# Patient Record
Sex: MALE
[De-identification: names, ages, dates, MRNs, and addresses within clinical notes are randomized per-mention and may not be internally consistent; named-entity substitution may affect disease eponyms.]

---

## 2019-06-27 ENCOUNTER — APPOINTMENT (OUTPATIENT)
Dept: ORTHOPEDIC SURGERY | Facility: CLINIC | Age: 39
End: 2019-06-27
Payer: COMMERCIAL

## 2019-06-27 VITALS — WEIGHT: 170 LBS | HEIGHT: 69 IN | BODY MASS INDEX: 25.18 KG/M2

## 2019-06-27 DIAGNOSIS — Z78.9 OTHER SPECIFIED HEALTH STATUS: ICD-10-CM

## 2019-06-27 DIAGNOSIS — Z87.39 PERSONAL HISTORY OF OTHER DISEASES OF THE MUSCULOSKELETAL SYSTEM AND CONNECTIVE TISSUE: ICD-10-CM

## 2019-06-27 DIAGNOSIS — S86.011A STRAIN OF RIGHT ACHILLES TENDON, INITIAL ENCOUNTER: ICD-10-CM

## 2019-06-27 PROCEDURE — 99204 OFFICE O/P NEW MOD 45 MIN: CPT

## 2019-06-27 NOTE — PHYSICAL EXAM
[de-identified] : Constitutional\par o Appearance : well-nourished, well developed, alert, in no acute distress \par Head and Face\par o Head :\par ¦ Inspection : atraumatic, normocephalic\par o Face :\par ¦ Inspection : no visible rash or discoloration\par Lymphatic\par o Epitrochlear Nodes : no lymphadenopathy \par o Popliteal Nodes : no palpable nodes present \par o Supraclavicular Nodes : no supraclavicular nodes present \par o Preauricular Nodes : no preauricular nodes present \par o Additional Nodes : no additional adenopathy present Skin and Subcutaneous Tissue \par o Head and Neck :\par ¦ Face : no facial lesions\par Neurologic\par o Mental Status Examination :\par ¦ Orientation : alert and oriented X 3\par o Coordination : finger-to-nose-to-finger testing normal bilaterally, heel-shin cerebellar test within normal limits bilaterally \par Psychiatric\par o Mood and Affect: mood normal, affect appropriate \par \par Right Lower Extremity\par o Right ankle :\par ¦ Inspection/Palpation : generalized swelling and tenderness to palpation over the distal achilles , gap of his Achilles tendon \par ¦ Range of Motion : pain with ROM especially dorsiflexion, unable to plantarflex. \par ¦ Stability : no joint instability on provocative testing\par ¦ Strength : limited by pain\par o Skin : no erythema or ecchymosis present, skin is intact\par o Sensation : sensation to pin intact\par Neurovascularly intact. \par Tests: Positive [place] test\par o Right foot:\par ¦ Inspection/Palpation : generalized tenderness to palpation, generalized swelling\par ¦ Range of Motion : arc of motion limited by pain, cannot plantar flex\par ¦ Stability : no joint instability on provocative testing\par ¦ Strength : limited by pain\par o Skin : no erythema or ecchymosis present\par \par Left Lower Extremity \par o Left ankle :\par ¦ Inspection/Palpation : no tenderness to palpation, no swelling \par ¦ Range of Motion : arc of motion full and painless in all planes\par ¦ Stability : no joint instability en provocative testing\par ¦ Strength : all muscles 5/5\par ¦ Tests and Signs : [place] test negative\par o Skin : no erythema or ecchymosis present\par o Sensation : sensation to pin intact\par \par o Left foot:\par ¦ Inspection/Palpation : no tenderness to palpation, no swelling\par ¦ Range of Motion : arc of motion full and painless in all planes\par ¦ Stability : no joint instability on provocative testing\par ¦ Strength : all muscles 5/5\par o Skin : no erythema or ecchymosis present\par \par Gait and Station:\par Gait: Ambulating with crutches NWB on the right , no significant extremity swelling or lymphedema\par

## 2019-06-27 NOTE — ADDENDUM
[FreeTextEntry1] : I, Asad Murray, acted solely as a scribe for Dr. Myron Devries on this date 06/27/2019 \par All medical record entries made by the Scribe were at my, Dr. Myron Devries, direction and personally dictated by me on 06/27/2019 . I have reviewed the chart and agree that the record accurately reflects my personal performance of the history, physical exam, assessment and plan. I have also personally directed, reviewed, and agreed with the chart.

## 2019-06-27 NOTE — HISTORY OF PRESENT ILLNESS
[de-identified] : 38 year old male presents with right ankle pain evaluation. He states that he was running when he felt as if "someone kicked him" in the ankle, and he could not bear weight on it. He was diagnosed with an Achilles rupture tendon by another orthopedist. He was told that he will need surgery to repair his Achilles tendon, but he would like a second opinion.  Pt presents today in a splint, ambulating with crutches. He has no pain unless he moves his ankle or bears weight. There is associated swelling of the ankle. He has not taken NSAID's or been to therapy for this. \par \par Prior Radiology Xrays of his Right Ankle: AP, lateral and mortise views were obtained and reveal avulsion of the Achilles tendon that may be off the calcaneus.

## 2019-06-27 NOTE — DISCUSSION/SUMMARY
[de-identified] : I discussed the underlying pathophysiology of the patient's condition in great detail with the patient. I had a lengthy discussion with the patient about how surgical intervention is likely the only solution in his Achilles tendon rupture. I also explained how the longer he waits before his surgery, the worse his outcome would be. I explained in great detail the importance of surgical importance, especially when it comes to returning his quality of life. I stressed the importance of getting surgery to repair his Achilles tendon in the next 1-2 weeks.  I explained the surgical risks that come with an Achilles tendon repair, and explained the post-op process and PT. I also spent a great deal of time addressing the patient's concerns about surgical intervention. I gave no assurances to the patient about a 100% return to normalcy. \par \par I explained how without surgery, there will be no healing because the tendon was pulled off the bone. However if it is a midsubstance rupture, it may be possible to treat nonoperatively. I am getting an urgent authorization for an MRI of his right ankle to further substantiate the location of the tear. I re-applied the splint today, and he will continue to be nonweightbearing. The use of ice and rest was reviewed with the patient. \par

## 2019-07-17 ENCOUNTER — APPOINTMENT (OUTPATIENT)
Dept: ORTHOPEDIC SURGERY | Facility: CLINIC | Age: 39
End: 2019-07-17
Payer: COMMERCIAL

## 2019-07-17 DIAGNOSIS — S86.012D STRAIN OF LEFT ACHILLES TENDON, SUBSEQUENT ENCOUNTER: ICD-10-CM

## 2019-07-17 DIAGNOSIS — S86.011D STRAIN OF RIGHT ACHILLES TENDON, SUBSEQUENT ENCOUNTER: ICD-10-CM

## 2019-07-17 PROCEDURE — 99214 OFFICE O/P EST MOD 30 MIN: CPT

## 2019-07-19 ENCOUNTER — APPOINTMENT (OUTPATIENT)
Dept: ORTHOPEDIC SURGERY | Facility: CLINIC | Age: 39
End: 2019-07-19

## 2019-07-23 ENCOUNTER — APPOINTMENT (OUTPATIENT)
Dept: ORTHOPEDIC SURGERY | Facility: HOSPITAL | Age: 39
End: 2019-07-23

## 2020-05-20 ENCOUNTER — OUTPATIENT (OUTPATIENT)
Dept: OUTPATIENT SERVICES | Age: 40
LOS: 1 days | End: 2020-05-20